# Patient Record
(demographics unavailable — no encounter records)

---

## 2025-04-03 NOTE — HISTORY OF PRESENT ILLNESS
[FreeTextEntry1] : The above-named patient presents to the office with a chief complaint of a recent onset of a lump which has occurred on the dorsal aspect of the proximal foot near the ankle joint and the area of maximal clinical discomfort is the area just proximal and distal to where the anterior tibial tendon crosses the ankle joint

## 2025-04-03 NOTE — PROCEDURE
[FreeTextEntry1] : had a lengthy discussion with the patient regarding the diagnosis etiology and differential diagnosis as well as treatment options for the presenting problem. Risks alternatives and benefits of treatment ranging from conservative to surgical explained in great detail. I also explained the progression of treatment from conservative to possible surgical treatment options as well as the benefits of each. I do stress conservative treatment if in fact conservative treatment is an option until it no longer provides relief. Over-the-counter products medications padding, and splinting were reviewed as well. All questions asked and answered appropriately to the patient's satisfaction  A complete and thorough evaluation of the type of shoes they should be wearing and type of shoes for this time of year was discussed with patient.  I have applied offloading pads to the patient's foot and have given them several samples to continue to use. I have also advised the patient that they can certainly purchase these from an outside vendor and if they are willing to do that and the name and number was supplied

## 2025-04-03 NOTE — REASON FOR VISIT
[Initial Visit] : an initial visit for [Foot Deformity] : foot deformity [FreeTextEntry2] : Left foot

## 2025-04-03 NOTE — REVIEW OF SYSTEMS
[Leg Claudication] : no intermittent leg claudication [Lower Ext Edema] : no lower extremity edema [Joint Swelling] : no joint swelling [Joint Stiffness] : no joint stiffness [As Noted in HPI] : as noted in HPI [Negative] : Heme/Lymph

## 2025-04-03 NOTE — PHYSICAL EXAM
[General Appearance - Alert] : alert [General Appearance - In No Acute Distress] : in no acute distress [FreeTextEntry3] : Vascular exam reveals palpable pedal pulses, the foot is warm to touch, there was good capillary fill time, the skin is normal in appearance there is no evidence of vascular disease or compromise at this time [No Joint Swelling] : no joint swelling [Pes Cavus] : pes cavus deformity [Normal Foot/Ankle] : Both lower extremities were exposed and visualized. Standing exam demonstrates normal foot posture and alignment. Hindfoot exam shows no hindfoot valgus or varus [de-identified] : overall muscle strength testing is normal, ROM to ankle, mid tarsal, MTP joints all WNL and w/out crepitus or jamming. No atrophy and overall weakness noted. [Skin Color & Pigmentation] : normal skin color and pigmentation [Skin Turgor] : normal skin turgor [] : no rash [Skin Lesions] : no skin lesions [Foot Ulcer] : no foot ulcer [Skin Induration] : no skin induration [FreeTextEntry1] :  Gross sensorium is intact, patient denies numbness tingling and sharp shooting pains [Oriented To Time, Place, And Person] : oriented to person, place, and time